# Patient Record
Sex: FEMALE | HISPANIC OR LATINO | Employment: PART TIME | URBAN - METROPOLITAN AREA
[De-identification: names, ages, dates, MRNs, and addresses within clinical notes are randomized per-mention and may not be internally consistent; named-entity substitution may affect disease eponyms.]

---

## 2021-04-02 LAB
HEPATITIS B SURFACE ANTIGEN (EXTERNAL): NONREACTIVE
HIV1+2 AB SERPL QL IA: NONREACTIVE
RUBELLA ANTIBODY IGG (EXTERNAL): NORMAL
TREPONEMA PALLIDUM ANTIBODY (EXTERNAL): NONREACTIVE

## 2021-04-14 ENCOUNTER — VIRTUAL VISIT (OUTPATIENT)
Dept: URGENT CARE | Facility: CLINIC | Age: 31
End: 2021-04-14
Payer: COMMERCIAL

## 2021-04-14 ENCOUNTER — NURSE TRIAGE (OUTPATIENT)
Dept: NURSING | Facility: CLINIC | Age: 31
End: 2021-04-14

## 2021-04-14 DIAGNOSIS — Z20.822 SUSPECTED COVID-19 VIRUS INFECTION: ICD-10-CM

## 2021-04-14 DIAGNOSIS — J01.90 ACUTE NON-RECURRENT SINUSITIS, UNSPECIFIED LOCATION: Primary | ICD-10-CM

## 2021-04-14 DIAGNOSIS — Z3A.14 14 WEEKS GESTATION OF PREGNANCY: ICD-10-CM

## 2021-04-14 PROCEDURE — 99203 OFFICE O/P NEW LOW 30 MIN: CPT | Mod: 95

## 2021-04-14 PROCEDURE — U0003 INFECTIOUS AGENT DETECTION BY NUCLEIC ACID (DNA OR RNA); SEVERE ACUTE RESPIRATORY SYNDROME CORONAVIRUS 2 (SARS-COV-2) (CORONAVIRUS DISEASE [COVID-19]), AMPLIFIED PROBE TECHNIQUE, MAKING USE OF HIGH THROUGHPUT TECHNOLOGIES AS DESCRIBED BY CMS-2020-01-R: HCPCS | Performed by: PHYSICIAN ASSISTANT

## 2021-04-14 PROCEDURE — U0005 INFEC AGEN DETEC AMPLI PROBE: HCPCS | Performed by: PHYSICIAN ASSISTANT

## 2021-04-14 NOTE — PATIENT INSTRUCTIONS
1.  Take antibiotic according to instructions, with food to prevent stomach upset. If you are prone to stomach upset with antibiotics, I recommend adding a probiotic to this regimen.  Culturelle is a trusted brand.  2.  I recommend using Mucinex to help thin mucus secretions.  Adding a nasal steroid spray such as Flonase can also be helpful with clearing sinus congestion.  3.  Take Tylenol 650mg every 4 hours. Do not exceed 4000mg of acetaminophen.    4.  Follow-up if you not having any improvement in your symptoms over the next 5 days.    Sinusitis  The sinuses are air-filled spaces within the bones of the face. They connect to the inside of the nose. Sinusitis is an inflammation of the tissue that lines the sinuses. Sinusitis can occur during a cold. It can also happen due to allergies to pollens and other particles in the air. Sinusitis can cause symptoms of sinus congestion and a feeling of fullness. A sinus infection causes fever, headache, and facial pain. There is often green or yellow fluid draining from the nose or into the back of the throat (post-nasal drip). You have been given antibiotics to treat this condition.  Home care    Take the full course of antibiotics as instructed. Do not stop taking them, even when you feel better.    Drink plenty of water, hot tea, and other liquids. This may help thin nasal mucus. It also may help your sinuses drain fluids.    Heat may help soothe painful areas of your face. Use a towel soaked in hot water. Or,  the shower and direct the warm spray onto your face. Using a vaporizer along with a menthol rub at night may also help soothe symptoms.     An expectorant with guaifenesin may help thin nasal mucus and help your sinuses drain fluids.    You can use an over-the-counter decongestant, unless a similar medicine was prescribed to you. Nasal sprays work the fastest. Use one that contains phenylephrine or oxymetazoline. First blow your nose gently. Then use the  spray. Do not use these medicines more often than directed on the label. If you do, your symptoms may get worse. You may also take pills that contain pseudoephedrine. Don t use products that combine multiple medicines. This is because side effects may be increased. Read labels. You can also ask the pharmacist for help. (People with high blood pressure should not use decongestants. They can raise blood pressure.)    Over-the-counter antihistamines may help if allergies contributed to your sinusitis.      Do not use nasal rinses or irrigation during an acute sinus infection, unless your healthcare provider tells you to. Rinsing may spread the infection to other areas in your sinuses.    Use acetaminophen or ibuprofen to control pain, unless another pain medicine was prescribed to you. If you have chronic liver or kidney disease or ever had a stomach ulcer, talk with your healthcare provider before using these medicines. (Aspirin should never be taken by anyone under age 18 who is ill with a fever. It may cause severe liver damage.)    Don't smoke. This can make symptoms worse.  Follow-up care  Follow up with your healthcare provider or our staff if you are better in 1 week.  When to seek medical advice  Call your healthcare provider if any of these occur:    Facial pain or headache that gets worse    Stiff neck    Unusual drowsiness or confusion    Swelling of your forehead or eyelids    Vision problems, such as blurred or double vision    Fever of 100.4 F (38 C) or higher, or as directed by your healthcare provider    Seizure    Breathing problems    Symptoms don't go away in 10 days  Prevention  Here are steps you can take to help prevent an infection:    Keep good hand washing habits.    Don t have close contact with people who have sore throats, colds, or other upper respiratory infections.    Don t smoke, and stay away from secondhand smoke.    Stay up to date with of your vaccines.  Date Last Reviewed:  11/1/2017 2000-2017 [employer]. [address] [place] [address], [place] [address]. All rights reserved. This information is not intended as a substitute for professional medical care. Always follow your healthcare professional's instructions.

## 2021-04-14 NOTE — TELEPHONE ENCOUNTER
Patient reporting symptoms starting 1 week ago with congestion, yellow nasal discharge. Painful gums. Productive cough with yellow sputum. Patient is  14 weeks gestation.   Denies COVID 19 exposure.     Transferred to Central Scheduling to request Virtual Visit.    Edith Vuong RN  [facility]      COVID 19 Nurse Triage Plan/Patient Instructions    Please be aware that novel coronavirus (COVID-19) may be circulating in the community. If you develop symptoms such as fever, cough, or SOB or if you have concerns about the presence of another infection including coronavirus (COVID-19), please contact your health care provider or visit [email or web address].     Disposition/Instructions    Virtual Visit with provider recommended. Reference Visit Selection Guide.    Thank you for taking steps to prevent the spread of this virus.  o Limit your contact with others.  o Wear a simple mask to cover your cough.  o Wash your hands well and often.    Resources    [facility]: About COVID-19: [email or web address]    CDC: What to Do If You're Sick: www.cdc.gov/coronavirus/2019-ncov/about/steps-when-sick.html    CDC: Ending Home Isolation: www.cdc.gov/coronavirus/2019-ncov/hcp/disposition-in-home-patients.html     CDC: Caring for Someone: www.cdc.gov/coronavirus/2019-ncov/if-you-are-sick/care-for-someone.html     [facility]: Interim Guidance for Hospital Discharge to Home: www.health.[facility].mn.us/diseases/coronavirus/hcp/hospdischarge.pdf    [facility] clinical trials (COVID-19 research studies): [email or web address]-trials     Below are the COVID-19 hotlines at the [place] Department of Health ([facility]). Interpreters are available.   o For health questions: Call 920-272-1686 or 1-559.257.8688 (7 a.m. to 7 p.m.)  o For questions about schools and childcare: Call 167-359-8827 or 1-950.488.8518 (7 a.m. to 7 p.m.)                     Reason for Disposition    [1] COVID-19  infection suspected by caller or triager AND [2] mild symptoms (cough, fever, or others) AND [3] no complications or SOB    Additional Information    Negative: SEVERE difficulty breathing (e.g., struggling for each breath, speaks in single words)    Negative: Difficult to awaken or acting confused (e.g., disoriented, slurred speech)    Negative: Bluish (or gray) lips or face now    Negative: Shock suspected (e.g., cold/pale/clammy skin, too weak to stand, low BP, rapid pulse)    Negative: Sounds like a life-threatening emergency to the triager    Negative: [1] COVID-19 exposure AND [2] no symptoms    Negative: [1] Lives with someone known to have influenza (flu test positive) AND [2] flu-like symptoms (e.g., cough, runny nose, sore throat, SOB; with or without fever)    Negative: [1] Adult with possible COVID-19 symptoms AND [2] triager concerned about severity of symptoms or other causes    Negative: COVID-19 vaccine reaction suspected (e.g., fever, headache, muscle aches) occurring during days 1-3 after getting vaccine    Negative: COVID-19 vaccine, questions about    Negative: COVID-19 and breastfeeding, questions about    Negative: SEVERE or constant chest pain or pressure (Exception: mild central chest pain, present only when coughing)    Negative: MODERATE difficulty breathing (e.g., speaks in phrases, SOB even at rest, pulse 100-120)    Negative: [1] Headache AND [2] stiff neck (can't touch chin to chest)    Negative: MILD difficulty breathing (e.g., minimal/no SOB at rest, SOB with walking, pulse <100)    Negative: Chest pain or pressure    Negative: Patient sounds very sick or weak to the triager    Negative: Fever > 103 F (39.4 C)    Negative: [1] Fever > 101 F (38.3 C) AND [2] age > 60    Negative: [1] Fever > 100.0 F (37.8 C) AND [2] bedridden (e.g., nursing home patient, CVA, chronic illness, recovering from surgery)    Negative: [1] HIGH RISK patient (e.g., age > 64 years, diabetes, heart or lung  disease, weak immune system) AND [2] new or worsening symptoms    Negative: [1] HIGH RISK patient AND [2] influenza is widespread in the community AND [3] ONE OR MORE respiratory symptoms: cough, sore throat, runny or stuffy nose    Negative: [1] HIGH RISK patient AND [2] influenza exposure within the last 7 days AND [3] ONE OR MORE respiratory symptoms: cough, sore throat, runny or stuffy nose    Negative: Fever present > 3 days (72 hours)    Negative: [1] Fever returns after gone for over 24 hours AND [2] symptoms worse or not improved    Negative: [1] Continuous (nonstop) coughing interferes with work or school AND [2] no improvement using cough treatment per protocol    Protocols used: CORONAVIRUS (COVID-19) DIAGNOSED OR [id number] 1.3

## 2021-04-14 NOTE — PROGRESS NOTES
Assessment & Plan:     Symptoms c/w acute sinusitis, Rx Augmentin. Mucinex and Tylenol also recommend. Discontinue Dayquil. Also advised COVID-19 testing, isolate while awaiting test results.  No issues with pregnancy.    See patient instructions below.  At the end of the encounter, I discussed diagnosis & medications. Discussed red flags for being seen in person at clinic/ER, as well as indications for follow up if no improvement. Patient understood and agreed to plan.       ICD-10-CM    1. Acute non-recurrent sinusitis, unspecified location  J01.90 amoxicillin-clavulanate (AUGMENTIN) 875-125 MG tablet   2. 14 weeks gestation of pregnancy  Z3A.14    3. Suspected COVID-19 virus infection  Z20.822 Symptomatic COVID-19 Virus (Coronavirus) by PCR         Return in about 1 week (around 4/21/2021) for Follow up w/ primary care provider if not better.    Phone Call Duration : 12 minutes    QUAN Ellis, DOROTHY  [facility]  -----------------------------------------------------------------------------------------------------------------------------------------------------------------    Subjective:   Lakeshia Mata is a 30 year old female, 14 weeks pregnant, who is contacted via telephone through scheduled urgent care virtual visit to discuss:   Chief Complaint   Patient presents with     Sinus Problem     Nasal congestion, productive cough, facial pain behind L eye and in L forehead, and L upper dental pain onset 1 week ago. She took Dayquil once, has also been using acetaminophen, honey, and humidifier. Patient reports no fever/chills, headache, chest pain, shortness of breath, abdominal pain, nausea, vomiting, diarrhea, rash, or any other symptoms. No known COVID exposure.    No issues with pregnancy such as vaginal bleeding or fluid leaking.    History reviewed. No pertinent past medical history.      Objective:   Gen:  NAD  Pulm: non-labored work of breathing    No results found for any visits on  04/14/21.    Patient Instructions   1.  Take antibiotic according to instructions, with food to prevent stomach upset. If you are prone to stomach upset with antibiotics, I recommend adding a probiotic to this regimen.  Culturelle is a trusted brand.  2.  I recommend using Mucinex to help thin mucus secretions.  Adding a nasal steroid spray such as Flonase can also be helpful with clearing sinus congestion.  3.  Take Tylenol 650mg every 4 hours. Do not exceed 4000mg of acetaminophen.    4.  Follow-up if you not having any improvement in your symptoms over the next 5 days.    Sinusitis  The sinuses are air-filled spaces within the bones of the face. They connect to the inside of the nose. Sinusitis is an inflammation of the tissue that lines the sinuses. Sinusitis can occur during a cold. It can also happen due to allergies to pollens and other particles in the air. Sinusitis can cause symptoms of sinus congestion and a feeling of fullness. A sinus infection causes fever, headache, and facial pain. There is often green or yellow fluid draining from the nose or into the back of the throat (post-nasal drip). You have been given antibiotics to treat this condition.  Home care    Take the full course of antibiotics as instructed. Do not stop taking them, even when you feel better.    Drink plenty of water, hot tea, and other liquids. This may help thin nasal mucus. It also may help your sinuses drain fluids.    Heat may help soothe painful areas of your face. Use a towel soaked in hot water. Or,  the shower and direct the warm spray onto your face. Using a vaporizer along with a menthol rub at night may also help soothe symptoms.     An expectorant with guaifenesin may help thin nasal mucus and help your sinuses drain fluids.    You can use an over-the-counter decongestant, unless a similar medicine was prescribed to you. Nasal sprays work the fastest. Use one that contains phenylephrine or oxymetazoline. First  blow your nose gently. Then use the spray. Do not use these medicines more often than directed on the label. If you do, your symptoms may get worse. You may also take pills that contain pseudoephedrine. Don t use products that combine multiple medicines. This is because side effects may be increased. Read labels. You can also ask the pharmacist for help. (People with high blood pressure should not use decongestants. They can raise blood pressure.)    Over-the-counter antihistamines may help if allergies contributed to your sinusitis.      Do not use nasal rinses or irrigation during an acute sinus infection, unless your healthcare provider tells you to. Rinsing may spread the infection to other areas in your sinuses.    Use acetaminophen or ibuprofen to control pain, unless another pain medicine was prescribed to you. If you have chronic liver or kidney disease or ever had a stomach ulcer, talk with your healthcare provider before using these medicines. (Aspirin should never be taken by anyone under age 18 who is ill with a fever. It may cause severe liver damage.)    Don't smoke. This can make symptoms worse.  Follow-up care  Follow up with your healthcare provider or our staff if you are better in 1 week.  When to seek medical advice  Call your healthcare provider if any of these occur:    Facial pain or headache that gets worse    Stiff neck    Unusual drowsiness or confusion    Swelling of your forehead or eyelids    Vision problems, such as blurred or double vision    Fever of 100.4 F (38 C) or higher, or as directed by your healthcare provider    Seizure    Breathing problems    Symptoms don't go away in 10 days  Prevention  Here are steps you can take to help prevent an infection:    Keep good hand washing habits.    Don t have close contact with people who have sore throats, colds, or other upper respiratory infections.    Don t smoke, and stay away from secondhand smoke.    Stay up to date with of your  vaccines.  Date Last Reviewed: 11/1/2017 2000-2017 [employer]. [address] [place] [address], [place] [address]. All rights reserved. This information is not intended as a substitute for professional medical care. Always follow your healthcare professional's instructions.

## 2021-04-15 LAB
LABORATORY COMMENT REPORT: NORMAL
SARS-COV-2 RNA RESP QL NAA+PROBE: NEGATIVE
SARS-COV-2 RNA RESP QL NAA+PROBE: NORMAL
SPECIMEN SOURCE: NORMAL
SPECIMEN SOURCE: NORMAL

## 2021-05-02 ENCOUNTER — HEALTH MAINTENANCE LETTER (OUTPATIENT)
Age: 31
End: 2021-05-02

## 2021-07-19 ENCOUNTER — OFFICE VISIT (OUTPATIENT)
Dept: FAMILY MEDICINE | Facility: CLINIC | Age: 31
End: 2021-07-19
Payer: COMMERCIAL

## 2021-07-19 VITALS
BODY MASS INDEX: 32.07 KG/M2 | SYSTOLIC BLOOD PRESSURE: 112 MMHG | TEMPERATURE: 98.5 F | HEART RATE: 84 BPM | DIASTOLIC BLOOD PRESSURE: 74 MMHG | OXYGEN SATURATION: 97 % | HEIGHT: 63 IN | WEIGHT: 181 LBS

## 2021-07-19 DIAGNOSIS — H60.92 OTITIS EXTERNA OF LEFT EAR, UNSPECIFIED CHRONICITY, UNSPECIFIED TYPE: ICD-10-CM

## 2021-07-19 DIAGNOSIS — L40.9 PSORIASIS: Primary | ICD-10-CM

## 2021-07-19 PROCEDURE — 99213 OFFICE O/P EST LOW 20 MIN: CPT | Performed by: PHYSICIAN ASSISTANT

## 2021-07-19 RX ORDER — CIPROFLOXACIN AND DEXAMETHASONE 3; 1 MG/ML; MG/ML
4 SUSPENSION/ DROPS AURICULAR (OTIC) 2 TIMES DAILY
Qty: 2 ML | Refills: 0 | Status: SHIPPED | OUTPATIENT
Start: 2021-07-19 | End: 2021-07-24

## 2021-07-19 RX ORDER — FOLIC ACID/MULTIVIT,IRON,MINER 0.4MG-18MG
TABLET ORAL
COMMUNITY
End: 2023-03-21

## 2021-07-19 RX ORDER — CLOBETASOL PROPIONATE 0.5 MG/G
CREAM TOPICAL 2 TIMES DAILY
Qty: 60 G | Refills: 0 | Status: SHIPPED | OUTPATIENT
Start: 2021-07-19 | End: 2023-03-21

## 2021-07-19 ASSESSMENT — MIFFLIN-ST. JEOR: SCORE: 1510.14

## 2021-07-19 NOTE — PROGRESS NOTES
"    Assessment & Plan     Psoriasis  Skin rash consistent with psoriasis.  Clobetasol prescribed.  Uses and Se discussed.  Follow up with derm if no improvement.   - clobetasol (TEMOVATE) 0.05 % external cream; Apply topically 2 times daily    Otitis externa of left ear, unspecified chronicity, unspecified type  No evidence of ear infection on today's exam  She will be leaving town soon and so we discussed having an ear drop just in case symptoms worsen.  Discussed symptoms that should cause hr to begin the ear drop.  She is agreeable. Otherwise advised that she use tylenol and a heating pad to the area.  Follow up if no improvement in 1-2 weeks  - ciprofloxacin-dexamethasone (CIPRODEX) 0.3-0.1 % otic suspension; Place 4 drops Into the left ear 2 times daily for 5 days       BMI:   Estimated body mass index is 32.06 kg/m  as calculated from the following:    Height as of this encounter: 1.6 m (5' 3\").    Weight as of this encounter: 82.1 kg (181 lb).       Return in about 2 weeks (around 8/2/2021) for if new or worsening symptoms.    DOROTHY Chanel [facility]   Lakeshia is a 30 year old who presents for the following health issues     HPI     Acute Illness  Acute illness concerns: ear pain  Onset/Duration: x 2 day  Symptoms:  Fever: no  Chills/Sweats: no  Headache (location?): no  Sinus Pressure: no  Conjunctivitis:  no  Ear Pain: YES: left  Rhinorrhea: no  Congestion: no  Sore Throat: no  Cough: no  Wheeze: no  Decreased Appetite: no  Nausea: no  Vomiting: no  Diarrhea: no  Dysuria/Freq.: no  Dysuria or Hematuria: no  Fatigue/Achiness: no  Sick/Strep Exposure: no  Therapies tried and outcome: None    Lakeshia is a 31 y/o, pregnant female who presents to the clinic with 2 day hx of left sided ear pain.  This is worse with laying on her left side.  No rear drainage or hearing loss.  No fevers or URI symptoms.  No recent swimming, diving or flying.     She has " "noticed an itchy patch of skin on the left sie of her next that seems to have waxed an waned in intensity over the past several months.  This seems to flare in the cold weather or very hot weather. No other rashes or joint pain noted.           Review of Systems   Constitutional, HEENT, cardiovascular, pulmonary, GI, , musculoskeletal, neuro, skin, endocrine and psych systems are negative, except as otherwise noted.      Objective    /74   Pulse 84   Temp 98.5  F (36.9  C) (Tympanic)   Ht 1.6 m (5' 3\")   Wt 82.1 kg (181 lb)   SpO2 97%   BMI 32.06 kg/m    Body mass index is 32.06 kg/m .  Physical Exam   GENERAL: healthy, alert and no distress  HENT: ear canals and TM's normal, nose and mouth without ulcers or lesions  RESP: lungs clear to auscultation - no rales, rhonchi or wheezes  CV: regular rate and rhythm, normal S1 S2, no S3 or S4, no murmur  SKIN: 3 x 3 erythematous thickened appearing plaque with overlying scale noted to left posterior neck  PSYCH: mentation appears normal, affect normal/bright        "

## 2021-09-14 ENCOUNTER — TRANSFERRED RECORDS (OUTPATIENT)
Dept: HEALTH INFORMATION MANAGEMENT | Facility: CLINIC | Age: 31
End: 2021-09-14

## 2021-09-14 LAB — GROUP B STREPTOCOCCUS (EXTERNAL): NEGATIVE

## 2021-10-08 DIAGNOSIS — Z34.90 ENCOUNTER FOR INDUCTION OF LABOR: Primary | ICD-10-CM

## 2021-10-11 ENCOUNTER — LAB (OUTPATIENT)
Dept: URGENT CARE | Facility: URGENT CARE | Age: 31
End: 2021-10-11
Attending: PHYSICIAN ASSISTANT
Payer: COMMERCIAL

## 2021-10-11 DIAGNOSIS — Z34.90 ENCOUNTER FOR INDUCTION OF LABOR: ICD-10-CM

## 2021-10-11 LAB — SARS-COV-2 RNA RESP QL NAA+PROBE: NEGATIVE

## 2021-10-11 PROCEDURE — U0003 INFECTIOUS AGENT DETECTION BY NUCLEIC ACID (DNA OR RNA); SEVERE ACUTE RESPIRATORY SYNDROME CORONAVIRUS 2 (SARS-COV-2) (CORONAVIRUS DISEASE [COVID-19]), AMPLIFIED PROBE TECHNIQUE, MAKING USE OF HIGH THROUGHPUT TECHNOLOGIES AS DESCRIBED BY CMS-2020-01-R: HCPCS

## 2021-10-11 PROCEDURE — U0005 INFEC AGEN DETEC AMPLI PROBE: HCPCS

## 2021-10-12 ENCOUNTER — HOSPITAL ENCOUNTER (INPATIENT)
Facility: CLINIC | Age: 31
LOS: 1 days | Discharge: HOME | End: 2021-10-13
Attending: STUDENT IN AN ORGANIZED HEALTH CARE EDUCATION/TRAINING PROGRAM | Admitting: STUDENT IN AN ORGANIZED HEALTH CARE EDUCATION/TRAINING PROGRAM
Payer: COMMERCIAL

## 2021-10-12 DIAGNOSIS — Z34.90 ENCOUNTER FOR INDUCTION OF LABOR: Primary | ICD-10-CM

## 2021-10-12 LAB
ABO/RH(D): NORMAL
ANTIBODY SCREEN: NEGATIVE
SPECIMEN EXPIRATION DATE: NORMAL
T PALLIDUM AB SER QL: NONREACTIVE

## 2021-10-12 PROCEDURE — 86900 BLOOD TYPING SEROLOGIC ABO: CPT | Performed by: PHYSICIAN ASSISTANT

## 2021-10-12 PROCEDURE — 250N000013 HC RX MED GY IP 250 OP 250 PS 637: Performed by: STUDENT IN AN ORGANIZED HEALTH CARE EDUCATION/TRAINING PROGRAM

## 2021-10-12 PROCEDURE — 10907ZC DRAINAGE OF AMNIOTIC FLUID, THERAPEUTIC FROM PRODUCTS OF CONCEPTION, VIA NATURAL OR ARTIFICIAL OPENING: ICD-10-PCS | Performed by: STUDENT IN AN ORGANIZED HEALTH CARE EDUCATION/TRAINING PROGRAM

## 2021-10-12 PROCEDURE — 36415 COLL VENOUS BLD VENIPUNCTURE: CPT | Performed by: PHYSICIAN ASSISTANT

## 2021-10-12 PROCEDURE — 3E033VJ INTRODUCTION OF OTHER HORMONE INTO PERIPHERAL VEIN, PERCUTANEOUS APPROACH: ICD-10-PCS | Performed by: STUDENT IN AN ORGANIZED HEALTH CARE EDUCATION/TRAINING PROGRAM

## 2021-10-12 PROCEDURE — 86780 TREPONEMA PALLIDUM: CPT | Performed by: PHYSICIAN ASSISTANT

## 2021-10-12 PROCEDURE — 258N000003 HC RX IP 258 OP 636: Performed by: STUDENT IN AN ORGANIZED HEALTH CARE EDUCATION/TRAINING PROGRAM

## 2021-10-12 PROCEDURE — 120N000012 HC R&B POSTPARTUM

## 2021-10-12 PROCEDURE — 722N000001 HC LABOR CARE VAGINAL DELIVERY SINGLE

## 2021-10-12 PROCEDURE — 250N000009 HC RX 250: Performed by: PHYSICIAN ASSISTANT

## 2021-10-12 RX ORDER — PRENATAL VIT/IRON FUM/FOLIC AC 27MG-0.8MG
1 TABLET ORAL DAILY
COMMUNITY

## 2021-10-12 RX ORDER — ONDANSETRON 4 MG/1
4 TABLET, ORALLY DISINTEGRATING ORAL EVERY 6 HOURS PRN
Status: DISCONTINUED | OUTPATIENT
Start: 2021-10-12 | End: 2021-10-12 | Stop reason: HOSPADM

## 2021-10-12 RX ORDER — IBUPROFEN 400 MG/1
800 TABLET, FILM COATED ORAL EVERY 6 HOURS PRN
Status: DISCONTINUED | OUTPATIENT
Start: 2021-10-12 | End: 2021-10-13 | Stop reason: HOSPADM

## 2021-10-12 RX ORDER — NALOXONE HYDROCHLORIDE 0.4 MG/ML
0.4 INJECTION, SOLUTION INTRAMUSCULAR; INTRAVENOUS; SUBCUTANEOUS
Status: DISCONTINUED | OUTPATIENT
Start: 2021-10-12 | End: 2021-10-12 | Stop reason: HOSPADM

## 2021-10-12 RX ORDER — OXYTOCIN 10 [USP'U]/ML
10 INJECTION, SOLUTION INTRAMUSCULAR; INTRAVENOUS
Status: DISCONTINUED | OUTPATIENT
Start: 2021-10-12 | End: 2021-10-13 | Stop reason: HOSPADM

## 2021-10-12 RX ORDER — BISACODYL 10 MG
10 SUPPOSITORY, RECTAL RECTAL DAILY PRN
Status: DISCONTINUED | OUTPATIENT
Start: 2021-10-12 | End: 2021-10-13 | Stop reason: HOSPADM

## 2021-10-12 RX ORDER — CARBOPROST TROMETHAMINE 250 UG/ML
250 INJECTION, SOLUTION INTRAMUSCULAR
Status: DISCONTINUED | OUTPATIENT
Start: 2021-10-12 | End: 2021-10-13 | Stop reason: HOSPADM

## 2021-10-12 RX ORDER — NALOXONE HYDROCHLORIDE 0.4 MG/ML
0.4 INJECTION, SOLUTION INTRAMUSCULAR; INTRAVENOUS; SUBCUTANEOUS
Status: DISCONTINUED | OUTPATIENT
Start: 2021-10-12 | End: 2021-10-13 | Stop reason: HOSPADM

## 2021-10-12 RX ORDER — PROCHLORPERAZINE MALEATE 5 MG
10 TABLET ORAL EVERY 6 HOURS PRN
Status: DISCONTINUED | OUTPATIENT
Start: 2021-10-12 | End: 2021-10-12 | Stop reason: HOSPADM

## 2021-10-12 RX ORDER — OXYTOCIN/0.9 % SODIUM CHLORIDE 30/500 ML
340 PLASTIC BAG, INJECTION (ML) INTRAVENOUS CONTINUOUS PRN
Status: DISCONTINUED | OUTPATIENT
Start: 2021-10-12 | End: 2021-10-13 | Stop reason: HOSPADM

## 2021-10-12 RX ORDER — OXYTOCIN/0.9 % SODIUM CHLORIDE 30/500 ML
100-340 PLASTIC BAG, INJECTION (ML) INTRAVENOUS CONTINUOUS PRN
Status: DISCONTINUED | OUTPATIENT
Start: 2021-10-12 | End: 2021-10-13 | Stop reason: HOSPADM

## 2021-10-12 RX ORDER — MODIFIED LANOLIN
OINTMENT (GRAM) TOPICAL
Status: DISCONTINUED | OUTPATIENT
Start: 2021-10-12 | End: 2021-10-13 | Stop reason: HOSPADM

## 2021-10-12 RX ORDER — PROCHLORPERAZINE 25 MG
25 SUPPOSITORY, RECTAL RECTAL EVERY 12 HOURS PRN
Status: DISCONTINUED | OUTPATIENT
Start: 2021-10-12 | End: 2021-10-12 | Stop reason: HOSPADM

## 2021-10-12 RX ORDER — METOCLOPRAMIDE 10 MG/1
10 TABLET ORAL EVERY 6 HOURS PRN
Status: DISCONTINUED | OUTPATIENT
Start: 2021-10-12 | End: 2021-10-12 | Stop reason: HOSPADM

## 2021-10-12 RX ORDER — ACETAMINOPHEN 325 MG/1
650 TABLET ORAL EVERY 4 HOURS PRN
Status: DISCONTINUED | OUTPATIENT
Start: 2021-10-12 | End: 2021-10-13 | Stop reason: HOSPADM

## 2021-10-12 RX ORDER — OXYCODONE HYDROCHLORIDE 5 MG/1
5 TABLET ORAL EVERY 4 HOURS PRN
Status: DISCONTINUED | OUTPATIENT
Start: 2021-10-12 | End: 2021-10-13 | Stop reason: HOSPADM

## 2021-10-12 RX ORDER — NALOXONE HYDROCHLORIDE 0.4 MG/ML
0.2 INJECTION, SOLUTION INTRAMUSCULAR; INTRAVENOUS; SUBCUTANEOUS
Status: DISCONTINUED | OUTPATIENT
Start: 2021-10-12 | End: 2021-10-12 | Stop reason: HOSPADM

## 2021-10-12 RX ORDER — MISOPROSTOL 200 UG/1
800 TABLET ORAL
Status: DISCONTINUED | OUTPATIENT
Start: 2021-10-12 | End: 2021-10-13 | Stop reason: HOSPADM

## 2021-10-12 RX ORDER — SODIUM CHLORIDE, SODIUM LACTATE, POTASSIUM CHLORIDE, CALCIUM CHLORIDE 600; 310; 30; 20 MG/100ML; MG/100ML; MG/100ML; MG/100ML
INJECTION, SOLUTION INTRAVENOUS CONTINUOUS
Status: DISCONTINUED | OUTPATIENT
Start: 2021-10-12 | End: 2021-10-13 | Stop reason: HOSPADM

## 2021-10-12 RX ORDER — TRANEXAMIC ACID 10 MG/ML
1 INJECTION, SOLUTION INTRAVENOUS EVERY 30 MIN PRN
Status: DISCONTINUED | OUTPATIENT
Start: 2021-10-12 | End: 2021-10-12 | Stop reason: HOSPADM

## 2021-10-12 RX ORDER — OXYTOCIN/0.9 % SODIUM CHLORIDE 30/500 ML
340 PLASTIC BAG, INJECTION (ML) INTRAVENOUS CONTINUOUS PRN
Status: COMPLETED | OUTPATIENT
Start: 2021-10-12 | End: 2021-10-12

## 2021-10-12 RX ORDER — IBUPROFEN 600 MG/1
600 TABLET, FILM COATED ORAL
Status: DISCONTINUED | OUTPATIENT
Start: 2021-10-12 | End: 2021-10-13 | Stop reason: HOSPADM

## 2021-10-12 RX ORDER — NALOXONE HYDROCHLORIDE 0.4 MG/ML
0.2 INJECTION, SOLUTION INTRAMUSCULAR; INTRAVENOUS; SUBCUTANEOUS
Status: DISCONTINUED | OUTPATIENT
Start: 2021-10-12 | End: 2021-10-13 | Stop reason: HOSPADM

## 2021-10-12 RX ORDER — MISOPROSTOL 200 UG/1
400 TABLET ORAL
Status: DISCONTINUED | OUTPATIENT
Start: 2021-10-12 | End: 2021-10-12 | Stop reason: HOSPADM

## 2021-10-12 RX ORDER — KETOROLAC TROMETHAMINE 30 MG/ML
30 INJECTION, SOLUTION INTRAMUSCULAR; INTRAVENOUS
Status: DISCONTINUED | OUTPATIENT
Start: 2021-10-12 | End: 2021-10-13 | Stop reason: HOSPADM

## 2021-10-12 RX ORDER — CARBOPROST TROMETHAMINE 250 UG/ML
250 INJECTION, SOLUTION INTRAMUSCULAR
Status: DISCONTINUED | OUTPATIENT
Start: 2021-10-12 | End: 2021-10-12 | Stop reason: HOSPADM

## 2021-10-12 RX ORDER — OXYTOCIN/0.9 % SODIUM CHLORIDE 30/500 ML
1-24 PLASTIC BAG, INJECTION (ML) INTRAVENOUS CONTINUOUS
Status: DISCONTINUED | OUTPATIENT
Start: 2021-10-12 | End: 2021-10-12 | Stop reason: HOSPADM

## 2021-10-12 RX ORDER — DOCUSATE SODIUM 100 MG/1
100 CAPSULE, LIQUID FILLED ORAL DAILY
Status: DISCONTINUED | OUTPATIENT
Start: 2021-10-12 | End: 2021-10-13 | Stop reason: HOSPADM

## 2021-10-12 RX ORDER — MISOPROSTOL 200 UG/1
400 TABLET ORAL
Status: DISCONTINUED | OUTPATIENT
Start: 2021-10-12 | End: 2021-10-13 | Stop reason: HOSPADM

## 2021-10-12 RX ORDER — LIDOCAINE 40 MG/G
CREAM TOPICAL
Status: DISCONTINUED | OUTPATIENT
Start: 2021-10-12 | End: 2021-10-12 | Stop reason: HOSPADM

## 2021-10-12 RX ORDER — OXYTOCIN 10 [USP'U]/ML
10 INJECTION, SOLUTION INTRAMUSCULAR; INTRAVENOUS
Status: DISCONTINUED | OUTPATIENT
Start: 2021-10-12 | End: 2021-10-12 | Stop reason: HOSPADM

## 2021-10-12 RX ORDER — METOCLOPRAMIDE HYDROCHLORIDE 5 MG/ML
10 INJECTION INTRAMUSCULAR; INTRAVENOUS EVERY 6 HOURS PRN
Status: DISCONTINUED | OUTPATIENT
Start: 2021-10-12 | End: 2021-10-12 | Stop reason: HOSPADM

## 2021-10-12 RX ORDER — MISOPROSTOL 200 UG/1
800 TABLET ORAL
Status: DISCONTINUED | OUTPATIENT
Start: 2021-10-12 | End: 2021-10-12 | Stop reason: HOSPADM

## 2021-10-12 RX ORDER — HYDROCORTISONE 2.5 %
CREAM (GRAM) TOPICAL 3 TIMES DAILY PRN
Status: DISCONTINUED | OUTPATIENT
Start: 2021-10-12 | End: 2021-10-13 | Stop reason: HOSPADM

## 2021-10-12 RX ORDER — METHYLERGONOVINE MALEATE 0.2 MG/ML
200 INJECTION INTRAVENOUS
Status: DISCONTINUED | OUTPATIENT
Start: 2021-10-12 | End: 2021-10-13 | Stop reason: HOSPADM

## 2021-10-12 RX ORDER — TRANEXAMIC ACID 10 MG/ML
1 INJECTION, SOLUTION INTRAVENOUS EVERY 30 MIN PRN
Status: DISCONTINUED | OUTPATIENT
Start: 2021-10-12 | End: 2021-10-13 | Stop reason: HOSPADM

## 2021-10-12 RX ORDER — ONDANSETRON 2 MG/ML
4 INJECTION INTRAMUSCULAR; INTRAVENOUS EVERY 6 HOURS PRN
Status: DISCONTINUED | OUTPATIENT
Start: 2021-10-12 | End: 2021-10-12 | Stop reason: HOSPADM

## 2021-10-12 RX ORDER — METHYLERGONOVINE MALEATE 0.2 MG/ML
200 INJECTION INTRAVENOUS
Status: DISCONTINUED | OUTPATIENT
Start: 2021-10-12 | End: 2021-10-12 | Stop reason: HOSPADM

## 2021-10-12 RX ADMIN — IBUPROFEN 800 MG: 400 TABLET, FILM COATED ORAL at 13:45

## 2021-10-12 RX ADMIN — SODIUM CHLORIDE, POTASSIUM CHLORIDE, SODIUM LACTATE AND CALCIUM CHLORIDE: 600; 310; 30; 20 INJECTION, SOLUTION INTRAVENOUS at 09:01

## 2021-10-12 RX ADMIN — IBUPROFEN 800 MG: 400 TABLET, FILM COATED ORAL at 19:49

## 2021-10-12 RX ADMIN — Medication 2 MILLI-UNITS/MIN: at 09:08

## 2021-10-12 RX ADMIN — BENZOCAINE: 11.4 AEROSOL, SPRAY TOPICAL at 16:56

## 2021-10-12 RX ADMIN — Medication 340 ML/HR: at 13:04

## 2021-10-12 RX ADMIN — ACETAMINOPHEN 650 MG: 325 TABLET, FILM COATED ORAL at 13:46

## 2021-10-12 RX ADMIN — DOCUSATE SODIUM 100 MG: 100 CAPSULE, LIQUID FILLED ORAL at 19:49

## 2021-10-12 RX ADMIN — ACETAMINOPHEN 650 MG: 325 TABLET, FILM COATED ORAL at 19:49

## 2021-10-12 ASSESSMENT — MIFFLIN-ST. JEOR: SCORE: 1573.18

## 2021-10-12 NOTE — PLAN OF CARE
Pt,  and infant arrived on floor at 1530. Report received from L&D RN Sarai in room 413. Pt and SO orientated to room, infant safe sleep and bulb syringe use addressed. Baby bands verified. All questions answered. Will continue to monitor.

## 2021-10-12 NOTE — PLAN OF CARE
Multip admitted to room 214 for elective induction of labor today.   External monitors applied with pts consent at 0750, health history obtained.    0830.  Ginny placed for fetal and uterine monitoring as pt would like to be able to move about and plans to have an un medicated delivery today.     0840.  at bedside, discussed plan of care, SVE performed by MD 2/90/-2, attempt to AROM but uncertain at this time as no fluid was seen.  Will continue to observe for fluid.   Plan to induce with Pitocin, pt and  verbalize agreement of plan to use IV Pitocin.    0908.  IV Pitocin started, questions answered.

## 2021-10-12 NOTE — PLAN OF CARE
VSS on RA. Fundus firm and @ umbilicus, small bleeding noted. Taking Tylenol and Ibuprofen for pain. Bladder scannned 230ml (after voiding only small trickle) @ 1700. Working on BF q2-3h. Tolerating regular diet. IV SL. All questions answered.

## 2021-10-12 NOTE — L&D DELIVERY NOTE
VAGINAL DELIVERY NOTE    SURGEON:Jacqui Waters MD  PREOPERATIVE DIAGNOSIS: Single intrauterine pregnancy at 40 1/7wga  POSTOPERATIVE DIAGNOSIS: Same, delivered  OPERATION PERFORMED: Normal spontaneous vaginal delivery  ANESTHESIA: None  EBL: 150 mL  FLUIDS: Lactated Ringers at 125 mL/hour  URINE OUTPUT: Not measured  DRAINS: None  SPECIMENS: Cord blood   COMPLICATIONS: None  FINDINGS: Viable male infant weighing PENDING grams with APGARs of 8 and 9 at 1 and 5 minutes, respectively.  CONDITION: Both mother and infant stable in the immediate postpartum period. The patient remained in labor and delivery for recovery and the infant was kept in the room with her.  INDICATIONS: The patient is a 31 year old  who presented at 40 1/7 weeks estimated gestational age for elective induction of labor. She had a favorable ely score. GBS negative. She was induced with AROM and pitocin and progressed without difficulty. FHT was category I throughout. Patient did not have an epidural or anything for pain control which is what she desired.     OPERATION: The patient progressed to complete/complete/+ 2 station and pushed for 5 minutes to deliver a viable male infant weighing PENDING grams with APGARs of 8 and 9 at one and five minutes, respectively via  over an intact perineum under epidural anesthesia. The baby delivered in occiput anterior position. There was no nuchal cord. The remainder of the body delivered without incident. Nose and mouth were bulb suctioned and the cord was clamped times two and cut. The baby was placed on the maternal abdomen. Placenta, membranes, and a three vessel cord delivered spontaneously and intact. Inspection of the perineum revealed no lacerations. The rectum, sulci, and cervix were inspected and noted to be intact. The fundus was firm with IV oxytocin and fundal massage. There were no sponges left in the vagina.

## 2021-10-12 NOTE — PLAN OF CARE
Data: Lakeshia Mata transferred to 413 via wheelchair at 1515. Baby transferred via parent's arms.  Action: Receiving unit notified of transfer: Yes. Patient and family notified of room change. Report given to Stevo Hills RN  at 1530. Belongings sent to receiving unit. Accompanied by Registered Nurse. Oriented patient to surroundings. Call light within reach. ID bands double-checked with receiving RN.  Response: Patient tolerated transfer and is stable.

## 2021-10-12 NOTE — H&P
[facility] Labor and Delivery History and Physical    Lakeshia Mata MRN# [id number]   Age: 31 year old YOB: 1990     Date of Admission:  10/12/2021    Primary care provider: [facility], [place] [facility] [place]         Chief Complaint:   Lakeshia Mata is a 31 year old  who is 40w1d pregnant and being admitted for induction of labor, indication maternal request.          Pregnancy history:     OBSTETRIC HISTORY:    OB History    Para Term  AB Living   2 1 1 0 0 1   SAB TAB Ectopic Multiple Live Births   0 0 0 0 0      # Outcome Date GA Lbr Marcio/2nd Weight Sex Delivery Anes PTL Lv   2 Current            1 Term                EDC: Estimated Date of Delivery: Oct 11, 2021    Prenatal Labs:   Lab Results   Component Value Date    AS Negative 10/12/2021       GBS Status:   Lab Results   Component Value Date    GBS Negative 2021       Active Problem List  Patient Active Problem List   Diagnosis     Encounter for induction of labor       Medication Prior to Admission  Medications Prior to Admission   Medication Sig Dispense Refill Last Dose     Prenatal Vit-Fe Fumarate-FA (PRENATAL MULTIVITAMIN W/IRON) 27-0.8 MG tablet Take 1 tablet by mouth daily   Past Week at Unknown time     clobetasol (TEMOVATE) 0.05 % external cream Apply topically 2 times daily 60 g 0      Docosahexaenoic Acid (PRENATAL DHA) 200 MG capsule Prenatal + DHA      .        Maternal Past Medical History:   History reviewed. No pertinent past medical history.                    Family History:   This patient has no significant family history            Social History:     Social History     Tobacco Use     Smoking status: Never Smoker     Smokeless tobacco: Never Used   Substance Use Topics     Alcohol use: Not Currently            Review of Systems:   C: NEGATIVE for fever, chills, change in weight  E/M: NEGATIVE for ear, mouth and throat problems  R: NEGATIVE for significant cough or SOB  CV: NEGATIVE for  chest pain, palpitations or peripheral edema          Physical Exam:   Vitals were reviewed    Constitutional: Awake, alert, cooperative, no apparent distress, and appears stated age.  HEENT: Normocephalic, without obvious abnormality, atramatic  Neck: Supple, symmetrical  Back: Symmetric, no costal vertebral tenderness.  Lungs: No increased work of breathing, good air exchange, clear to auscultation bilaterally, no crackles or wheezing.  Cardiovascular: Regular rate and rhythm  Abdomen: Gravid. Non-tender  Genitourinary: No urethral discharge, normal external genitalia, no hernia.  Neurologic: Awake, alert, oriented to name, place and time.  Cranial nerves II-XII are grossly intact.    Psychiatric: Normal affect, mood  Skin: No rashes, erythema, pallor, petechia or purpura.     Cervix:   Membranes: AROM   Dilation: 2   Effacement: 90%   Station:-2    Presentation:Cephalic  Fetal Heart Rate Tracing: reactive and reassuring  Tocometer: no contractions                       Assessment:   Lakeshia Mata is a 31 year old  at 40w1d here for elective induction of labor   2. GBS negative         Plan:   Admit - see IP orders  Labor induction with AROM, pitocin  Anticipate vaginal delivery     Jacqui Waters MD

## 2021-10-13 VITALS
TEMPERATURE: 98.1 F | WEIGHT: 196 LBS | SYSTOLIC BLOOD PRESSURE: 104 MMHG | HEIGHT: 63 IN | HEART RATE: 81 BPM | BODY MASS INDEX: 34.73 KG/M2 | RESPIRATION RATE: 12 BRPM | DIASTOLIC BLOOD PRESSURE: 60 MMHG

## 2021-10-13 PROCEDURE — 250N000013 HC RX MED GY IP 250 OP 250 PS 637: Performed by: OBSTETRICS & GYNECOLOGY

## 2021-10-13 PROCEDURE — 250N000013 HC RX MED GY IP 250 OP 250 PS 637: Performed by: STUDENT IN AN ORGANIZED HEALTH CARE EDUCATION/TRAINING PROGRAM

## 2021-10-13 RX ORDER — BREAST PUMP
1 EACH MISCELLANEOUS
Qty: 1 EACH | Refills: 0 | Status: SHIPPED | OUTPATIENT
Start: 2021-10-13 | End: 2023-03-21

## 2021-10-13 RX ORDER — ACETAMINOPHEN 325 MG/1
650 TABLET ORAL EVERY 4 HOURS PRN
Qty: 30 TABLET | COMMUNITY
Start: 2021-10-13 | End: 2023-03-21

## 2021-10-13 RX ORDER — IBUPROFEN 600 MG/1
600 TABLET, FILM COATED ORAL
Qty: 30 TABLET | Refills: 0 | COMMUNITY
Start: 2021-10-13 | End: 2023-03-21

## 2021-10-13 RX ORDER — DOCUSATE SODIUM 100 MG/1
100 CAPSULE, LIQUID FILLED ORAL DAILY
Qty: 60 CAPSULE | Refills: 0 | COMMUNITY
Start: 2021-10-13 | End: 2023-03-21

## 2021-10-13 RX ORDER — CALCIUM CARBONATE 500 MG/1
500-1000 TABLET, CHEWABLE ORAL 4 TIMES DAILY PRN
Status: DISCONTINUED | OUTPATIENT
Start: 2021-10-13 | End: 2021-10-13 | Stop reason: HOSPADM

## 2021-10-13 RX ADMIN — ACETAMINOPHEN 650 MG: 325 TABLET, FILM COATED ORAL at 06:07

## 2021-10-13 RX ADMIN — DOCUSATE SODIUM 100 MG: 100 CAPSULE, LIQUID FILLED ORAL at 09:08

## 2021-10-13 RX ADMIN — IBUPROFEN 800 MG: 400 TABLET, FILM COATED ORAL at 06:07

## 2021-10-13 RX ADMIN — CALCIUM CARBONATE (ANTACID) CHEW TAB 500 MG 1000 MG: 500 CHEW TAB at 03:27

## 2021-10-13 NOTE — LACTATION NOTE
Routine visit with Lakeshia prior to discharge. She is eager to discharge home and states that breastfeeding has been going well, though infant sleepy at times and not always latched as deeply as he could be. Recommend arousing infant and undressing to diaper to keep awake. Normal feeding behaviors in first 48 hours discussed; second night expectations reviewed. Lakeshia states that breastfeeding was challenging for the first 6 weeks with her first child, but she went on to breastfeed for 2.5 years. She uses [facility] and states that this resource is available to her. Lactation resources sheet given. Lakeshia denies having any further questions.    Rani Dominguez RN, IBCLC

## 2021-10-13 NOTE — PROVIDER NOTIFICATION
10/13/21 0305   Provider Notification   Provider Name/Title Dr. Looney   Method of Notification Phone   Request Evaluate-Remote   Notification Reason Medication Request     Pt requesting Tums- verbal order received.

## 2021-10-13 NOTE — PLAN OF CARE
VSS and fundus firm.  Patient states pain adequately managed with PRN pain medications.  Patient discharged to home at 1645. Discharge instructions, self care, and reasons to call doctor reviewed with patient.  Rx reviewed, verified and given to patient.  Patient verbalizes understanding to make follow-up appointment as directed by physician.  Patient states no questions with discharge.  Hug and Kisses tags removed.

## 2021-10-13 NOTE — PROGRESS NOTES
MD Notification    Notified Person: MD    Notified Person Name: Dr Waters    Notification Date/Time: 10/13    Notification Interaction: page    Purpose of Notification: pt c/o small raised lump to lateral aspect of L knee, has some tenderness behind knee    Orders Received: follow up within 1 week if site becomes painful, give pt DVT informational handout    Comments:

## 2021-10-13 NOTE — PLAN OF CARE
VSS. Voiding without difficulty. Scant vaginal bleeding. Pain controlled with ibuprofen and tylenol. Breastfeeding infant on demand, assistance provided, started pumping due to infant being sleepy at breast. FOB at bedside, supportive and involved in cares. Will continue to monitor.

## 2021-10-13 NOTE — DISCHARGE SUMMARY
[facility] Discharge Summary    Lakeshia Mata MRN# [id number]   Age: 31 year old YOB: 1990     Date of Admission:  10/12/2021  Date of Discharge::  10/13/2021   Admitting Physician:  Jacqui Waters MD  Discharge Physician:  Jacqui Waters MD     Home clinic: [facility]          Admission Diagnoses:   Elective induction of labor at 40wga         Discharge Diagnosis:   Same, s/p             Procedures:     Procedure(s): Spontaneous vaginal delivery              Medications Prior to Admission:     Medications Prior to Admission   Medication Sig Dispense Refill Last Dose     Prenatal Vit-Fe Fumarate-FA (PRENATAL MULTIVITAMIN W/IRON) 27-0.8 MG tablet Take 1 tablet by mouth daily   Past Week at Unknown time     clobetasol (TEMOVATE) 0.05 % external cream Apply topically 2 times daily 60 g 0      Docosahexaenoic Acid (PRENATAL DHA) 200 MG capsule Prenatal + DHA                Discharge Medications:     Current Discharge Medication List      CONTINUE these medications which have NOT CHANGED    Details   Prenatal Vit-Fe Fumarate-FA (PRENATAL MULTIVITAMIN W/IRON) 27-0.8 MG tablet Take 1 tablet by mouth daily      clobetasol (TEMOVATE) 0.05 % external cream Apply topically 2 times daily  Qty: 60 g, Refills: 0    Associated Diagnoses: Psoriasis      Docosahexaenoic Acid (PRENATAL DHA) 200 MG capsule Prenatal + DHA                   Consultations:   No consultations were requested during this admission          Brief History of Labor:   The patient is a 31 year old  who presented at 40 1/7 weeks estimated gestational age for elective induction of labor. She had a favorable ely score. GBS negative. She was induced with AROM and pitocin and progressed without difficulty. FHT was category I throughout. Patient did not have an epidural or anything for pain control which is what she desired.            Hospital Course:   The patient's postpartum course was unremarkable.  She recovered as  anticipated and experienced no post-operative complications.  Upon discharge, her pain was well controlled. Vaginal bleeding is mild to moderate.  Voiding without difficulty.  Ambulating well and tolerating a normal diet.  No fever. Infant is stable.  She was discharged on post-partum day #1.    Post-partum hemoglobin: No results found for: HGB          Discharge Instructions and Follow-Up:     Discharge diet: Regular   Discharge activity: Activity as tolerated, no lifting greater than 10 lbs   Discharge follow-up: Follow up with consultant in 6 weeks for post partum visit   Wound care: Keep wound clean and dry           Discharge Disposition:     Discharged to home      Attestation:  I have reviewed today's vital signs, notes, medications, labs and imaging.  Amount of time performed on this discharge summary: 10 minutes.    Jacqui Waters MD

## 2021-10-17 ENCOUNTER — HEALTH MAINTENANCE LETTER (OUTPATIENT)
Age: 31
End: 2021-10-17

## 2022-10-03 ENCOUNTER — HEALTH MAINTENANCE LETTER (OUTPATIENT)
Age: 32
End: 2022-10-03

## 2022-11-07 ENCOUNTER — E-VISIT (OUTPATIENT)
Dept: URGENT CARE | Facility: CLINIC | Age: 32
End: 2022-11-07
Payer: COMMERCIAL

## 2022-11-07 DIAGNOSIS — N39.0 ACUTE UTI (URINARY TRACT INFECTION): Primary | ICD-10-CM

## 2022-11-07 PROCEDURE — 99421 OL DIG E/M SVC 5-10 MIN: CPT | Performed by: PHYSICIAN ASSISTANT

## 2022-11-07 RX ORDER — NITROFURANTOIN 25; 75 MG/1; MG/1
100 CAPSULE ORAL 2 TIMES DAILY
Qty: 10 CAPSULE | Refills: 0 | Status: SHIPPED | OUTPATIENT
Start: 2022-11-07 | End: 2022-11-12

## 2022-11-07 NOTE — PATIENT INSTRUCTIONS
Dear Lakeshia Mata    After reviewing your responses, I've been able to diagnose you with a urinary tract infection, which is a common infection of the bladder with bacteria.  This is not a sexually transmitted infection, though urinating immediately after intercourse can help prevent infections.  Drinking lots of fluids is also helpful to clear your current infection and prevent the next one.      I have sent a prescription for antibiotics to your pharmacy to treat this infection.    It is important that you take all of your prescribed medication even if your symptoms are improving after a few doses.  Taking all of your medicine helps prevent the symptoms from returning.     If your symptoms worsen, you develop pain in your back or stomach, develop fevers, or are not improving in 5 days, please contact your primary care provider for an appointment or visit any of our convenient Walk-in or Urgent Care Centers to be seen, which can be found on our website here.      We are unable to treat your other concerns in an e-visit. I recommend scheduling an in person visit with your primary care provider or gynecologist as soon as possible to discuss this.      Thanks again for choosing us as your health care partner,    Tyree Murillo PA-C

## 2023-01-18 ENCOUNTER — LAB REQUISITION (OUTPATIENT)
Dept: LAB | Facility: CLINIC | Age: 33
End: 2023-01-18
Payer: COMMERCIAL

## 2023-01-18 DIAGNOSIS — R39.9 UNSPECIFIED SYMPTOMS AND SIGNS INVOLVING THE GENITOURINARY SYSTEM: ICD-10-CM

## 2023-01-18 PROCEDURE — 87086 URINE CULTURE/COLONY COUNT: CPT | Mod: ORL | Performed by: PHYSICIAN ASSISTANT

## 2023-01-20 LAB — BACTERIA UR CULT: NORMAL

## 2023-02-11 ENCOUNTER — HEALTH MAINTENANCE LETTER (OUTPATIENT)
Age: 33
End: 2023-02-11

## 2023-03-21 ENCOUNTER — OFFICE VISIT (OUTPATIENT)
Dept: FAMILY MEDICINE | Facility: CLINIC | Age: 33
End: 2023-03-21
Payer: COMMERCIAL

## 2023-03-21 ENCOUNTER — TELEPHONE (OUTPATIENT)
Dept: FAMILY MEDICINE | Facility: CLINIC | Age: 33
End: 2023-03-21

## 2023-03-21 VITALS
DIASTOLIC BLOOD PRESSURE: 70 MMHG | SYSTOLIC BLOOD PRESSURE: 103 MMHG | WEIGHT: 166.4 LBS | BODY MASS INDEX: 28.41 KG/M2 | HEART RATE: 66 BPM | TEMPERATURE: 97.2 F | OXYGEN SATURATION: 98 % | HEIGHT: 64 IN | RESPIRATION RATE: 14 BRPM

## 2023-03-21 DIAGNOSIS — N89.8 VAGINAL ITCHING: ICD-10-CM

## 2023-03-21 DIAGNOSIS — R31.9 URINARY TRACT INFECTION WITH HEMATURIA, SITE UNSPECIFIED: ICD-10-CM

## 2023-03-21 DIAGNOSIS — B37.31 CANDIDIASIS OF VAGINA: ICD-10-CM

## 2023-03-21 DIAGNOSIS — R30.0 DYSURIA: ICD-10-CM

## 2023-03-21 DIAGNOSIS — K64.4 EXTERNAL HEMORRHOID: Primary | ICD-10-CM

## 2023-03-21 DIAGNOSIS — N76.0 BV (BACTERIAL VAGINOSIS): ICD-10-CM

## 2023-03-21 DIAGNOSIS — N89.8 VAGINAL DISCHARGE: ICD-10-CM

## 2023-03-21 DIAGNOSIS — B96.89 BV (BACTERIAL VAGINOSIS): ICD-10-CM

## 2023-03-21 DIAGNOSIS — K59.00 CONSTIPATION, UNSPECIFIED CONSTIPATION TYPE: ICD-10-CM

## 2023-03-21 DIAGNOSIS — N39.0 URINARY TRACT INFECTION WITH HEMATURIA, SITE UNSPECIFIED: ICD-10-CM

## 2023-03-21 LAB
ALBUMIN UR-MCNC: NEGATIVE MG/DL
APPEARANCE UR: CLEAR
BACTERIA #/AREA URNS HPF: ABNORMAL /HPF
BILIRUB UR QL STRIP: NEGATIVE
CLUE CELLS: PRESENT
COLOR UR AUTO: YELLOW
GLUCOSE UR STRIP-MCNC: NEGATIVE MG/DL
HGB UR QL STRIP: ABNORMAL
KETONES UR STRIP-MCNC: NEGATIVE MG/DL
LEUKOCYTE ESTERASE UR QL STRIP: ABNORMAL
MUCOUS THREADS #/AREA URNS LPF: PRESENT /LPF
NITRATE UR QL: NEGATIVE
PH UR STRIP: 7.5 [PH] (ref 5–7)
RBC #/AREA URNS AUTO: ABNORMAL /HPF
SP GR UR STRIP: 1.02 (ref 1–1.03)
SQUAMOUS #/AREA URNS AUTO: ABNORMAL /LPF
TRICHOMONAS, WET PREP: ABNORMAL
UROBILINOGEN UR STRIP-ACNC: 0.2 E.U./DL
WBC #/AREA URNS AUTO: ABNORMAL /HPF
WBC'S/HIGH POWER FIELD, WET PREP: ABNORMAL
YEAST, WET PREP: PRESENT

## 2023-03-21 PROCEDURE — 81001 URINALYSIS AUTO W/SCOPE: CPT | Performed by: INTERNAL MEDICINE

## 2023-03-21 PROCEDURE — 87210 SMEAR WET MOUNT SALINE/INK: CPT | Performed by: INTERNAL MEDICINE

## 2023-03-21 PROCEDURE — 99214 OFFICE O/P EST MOD 30 MIN: CPT | Performed by: INTERNAL MEDICINE

## 2023-03-21 RX ORDER — METRONIDAZOLE 7.5 MG/G
GEL VAGINAL
COMMUNITY
Start: 2023-01-18 | End: 2023-03-21

## 2023-03-21 RX ORDER — NITROFURANTOIN 25; 75 MG/1; MG/1
100 CAPSULE ORAL 2 TIMES DAILY
Qty: 10 CAPSULE | Refills: 0 | Status: SHIPPED | OUTPATIENT
Start: 2023-03-21

## 2023-03-21 RX ORDER — POLYETHYLENE GLYCOL 3350 17 G/17G
1 POWDER, FOR SOLUTION ORAL DAILY
Qty: 500 G | Refills: 0 | Status: SHIPPED | OUTPATIENT
Start: 2023-03-21

## 2023-03-21 RX ORDER — LEVONORGESTREL 52 MG/1
INTRAUTERINE DEVICE INTRAUTERINE
COMMUNITY
Start: 2021-12-10

## 2023-03-21 RX ORDER — METRONIDAZOLE 500 MG/1
500 TABLET ORAL 2 TIMES DAILY
Qty: 14 TABLET | Refills: 0 | Status: SHIPPED | OUTPATIENT
Start: 2023-03-21 | End: 2023-03-28

## 2023-03-21 RX ORDER — HYDROCORTISONE 25 MG/G
CREAM TOPICAL 2 TIMES DAILY PRN
Qty: 30 G | Refills: 1 | Status: SHIPPED | OUTPATIENT
Start: 2023-03-21

## 2023-03-21 RX ORDER — FLUCONAZOLE 150 MG/1
150 TABLET ORAL ONCE
Qty: 1 TABLET | Refills: 0 | Status: SHIPPED | OUTPATIENT
Start: 2023-03-21 | End: 2023-03-21

## 2023-03-21 ASSESSMENT — PATIENT HEALTH QUESTIONNAIRE - PHQ9
SUM OF ALL RESPONSES TO PHQ QUESTIONS 1-9: 6
SUM OF ALL RESPONSES TO PHQ QUESTIONS 1-9: 6
10. IF YOU CHECKED OFF ANY PROBLEMS, HOW DIFFICULT HAVE THESE PROBLEMS MADE IT FOR YOU TO DO YOUR WORK, TAKE CARE OF THINGS AT HOME, OR GET ALONG WITH OTHER PEOPLE: NOT DIFFICULT AT ALL

## 2023-03-21 ASSESSMENT — PAIN SCALES - GENERAL: PAINLEVEL: MILD PAIN (2)

## 2023-03-21 NOTE — PATIENT INSTRUCTIONS
Sent in medication to your pharmacy for hemorrhoids. Also included Laxatives to be used to avoid constipation.    Will check your Urine exam and wet prep and treat if abnormal.     =====================

## 2023-03-21 NOTE — TELEPHONE ENCOUNTER
----- Message from Amaris Vargas MD sent at 3/21/2023 12:14 PM CDT -----  Your Vaginal swab is positive for Bacterial Vaginosis and Yeast infection, Sent in medications to pharmacy.     BV is NOT a sexually transmitted disease. I have sent in antibiotic to your pharmacy. Please avoid alcohol and breast feeding while you are on this medication. You can find additional information regarding this condition as below. Let me know if you have any questions.     You do have UTI positive which is mild. Given your symptoms sent in antibiotic to your pharmacy.     Amaris Vargas MD on 3/21/2023 at 12:12 PM     =================

## 2023-03-21 NOTE — PROGRESS NOTES
Assessment and Plan  1. External hemorrhoid  New problem, intermittent flareup of perianal pain with palpable lump as she states in HPI, physical exam positive for 1 external hemorrhoid located at 7 o'clock position on rectal exam.  No other masses felt on the digital rectal, shared decision with conservative management of sitz bath's and starting off on topical hydrocortisone for improvement.  AVS given with further details.  - hydrocortisone, Perianal, (HYDROCORTISONE) 2.5 % cream; Place rectally 2 times daily as needed for hemorrhoids  Dispense: 30 g; Refill: 1    2. Candidiasis of vagina  2. Vaginal discharge  3. Vaginal itching  New problem, patient does have history of recurrent BV in the past using Flagyl vaginal.  Requesting for a recheck at this time with ongoing symptoms intermittently.  UPDATE -wet prep positive for vaginal candidiasis, sent in antifungal to pharmacy.  - Wet prep - lab collect; Future  - Wet prep - lab collect  - fluconazole (DIFLUCAN) 150 MG tablet; Take 1 tablet (150 mg) by mouth once for 1 dose  Dispense: 1 tablet; Refill: 0    5. BV (bacterial vaginosis)  Ongoing problem, Uncontrolled. Wet prep positive for BV. Will send Flagyl to pharmacy.   - metroNIDAZOLE (FLAGYL) 500 MG tablet; Take 1 tablet (500 mg) by mouth 2 times daily for 7 days  Dispense: 14 tablet; Refill: 0    5. Urinary tract infection with hematuria, site unspecified  4. Dysuria  New problem, patient does states that given her intermittent vaginal discharge along with dysuria wants to check her urine exam to make sure there is no UTI.  UPDATE -UA positive for UTI, sent in antibiotic to pharmacy.  - nitroFURantoin macrocrystal-monohydrate (MACROBID) 100 MG capsule; Take 1 capsule (100 mg) by mouth 2 times daily  Dispense: 10 capsule; Refill: 0  - UA with Microscopic reflex to Culture - lab collect; Future  - UA with Microscopic reflex to Culture - lab collect  - UA Microscopic with Reflex to Culture    5. Constipation,  unspecified constipation type  Discussed on avoiding constipation to prevent recurrence of external hemorrhoid flareups which patient understood and agreed with the plan.  Sent in the MiraLAX as needed for constipation.  - polyethylene glycol (MIRALAX) 17 GM/Dose powder; Take 17 g (1 capful.) by mouth daily  Dispense: 500 g; Refill: 0        Patient Instructions   Sent in medication to your pharmacy for hemorrhoids. Also included Laxatives to be used to avoid constipation.    Will check your Urine exam and wet prep and treat if abnormal.     =====================    Return in about 4 weeks (around 4/18/2023), or if symptoms worsen or fail to improve, for Preventative Visit.    Amaris Vargas MD  [facility] FOUZIA Asher is a 32 year old, presenting for the following health issues:  Rectal Problem      History of Present Illness       Reason for visit:  Hemerroid  Symptom onset:  1-3 days ago    She eats 2-3 servings of fruits and vegetables daily.She consumes 1 sweetened beverage(s) daily.She exercises with enough effort to increase her heart rate 10 to 19 minutes per day.  She exercises with enough effort to increase her heart rate 3 or less days per week.   She is taking medications regularly.    Today's PHQ-9         PHQ-9 Total Score: 6    PHQ-9 Q9 Thoughts of better off dead/self-harm past 2 weeks :   Not at all    How difficult have these problems made it for you to do your work, take care of things at home, or get along with other people: Not difficult at all    Pt is new to me, last seen our group in 7/2021 for Psoriasis. She is here for External hemorhoid. On Laxatives ( ANusol - suppository / ANusol cream )   NO labs done anytime      No Known Allergies     History reviewed. No pertinent past medical history.    Past Surgical History:   Procedure Laterality Date     COLPOSCOPY         History reviewed. No pertinent family history.    Social History     Tobacco  "Use     Smoking status: Never     Smokeless tobacco: Never   Substance Use Topics     Alcohol use: Not Currently        Current Outpatient Medications   Medication     fluconazole (DIFLUCAN) 150 MG tablet     hydrocortisone, Perianal, (HYDROCORTISONE) 2.5 % cream     levonorgestrel (MIRENA, 52 MG,) 20 MCG/DAY IUD     nitroFURantoin macrocrystal-monohydrate (MACROBID) 100 MG capsule     polyethylene glycol (MIRALAX) 17 GM/Dose powder     Prenatal Vit-Fe Fumarate-FA (PRENATAL MULTIVITAMIN W/IRON) 27-0.8 MG tablet     No current facility-administered medications for this visit.        Review of Systems   Constitutional, HEENT, cardiovascular, pulmonary, GI, , musculoskeletal, neuro, skin, endocrine and psych systems are negative, except as otherwise noted.      Objective    /70   Pulse 66   Temp 97.2  F (36.2  C) (Tympanic)   Resp 14   Ht 1.615 m (5' 3.58\")   Wt 75.5 kg (166 lb 6.4 oz)   SpO2 98%   BMI 28.94 kg/m    Body mass index is 28.94 kg/m .  Physical Exam   GENERAL: healthy, alert and no distress  NECK: no adenopathy, no asymmetry, masses, or scars and thyroid normal to palpation  RESP: lungs clear to auscultation - no rales, rhonchi or wheezes  CV: regular rate and rhythm, normal S1 S2, no S3 or S4, no murmur, click or rub, no peripheral edema and peripheral pulses strong  ABDOMEN: soft, nontender, no hepatosplenomegaly, no masses and bowel sounds normal  RECTAL :  Positive for 1 external hemorrhoid located at 7 o'clock position on rectal exam.  No other masses felt on the digital rectal  MS: no gross musculoskeletal defects noted, no edema        "

## 2023-05-20 ENCOUNTER — HEALTH MAINTENANCE LETTER (OUTPATIENT)
Age: 33
End: 2023-05-20

## 2024-07-27 ENCOUNTER — HEALTH MAINTENANCE LETTER (OUTPATIENT)
Age: 34
End: 2024-07-27

## 2024-09-20 NOTE — RESULT ENCOUNTER NOTE
Your Vaginal swab is positive for Bacterial Vaginosis and Yeast infection, Sent in medications to pharmacy.     BV is NOT a sexually transmitted disease. I have sent in antibiotic to your pharmacy. Please avoid alcohol and breast feeding while you are on this medication. You can find additional information regarding this condition as below. Let me know if you have any questions.     You do have UTI positive which is mild. Given your symptoms sent in antibiotic to your pharmacy.     Amaris Vargas MD on 3/21/2023 at 12:12 PM     ================= Bed/Stretcher in lowest position, wheels locked, appropriate side rails in place/Call bell, personal items and telephone in reach/Instruct patient to call for assistance before getting out of bed/chair/stretcher/Non-slip footwear applied when patient is off stretcher/[place] to call system/Physically safe environment - no spills, clutter or unnecessary equipment/Purposeful proactive rounding/Room/bathroom lighting operational, light cord in reach

## 2025-08-10 ENCOUNTER — HEALTH MAINTENANCE LETTER (OUTPATIENT)
Age: 35
End: 2025-08-10